# Patient Record
(demographics unavailable — no encounter records)

---

## 2025-07-21 NOTE — DISCUSSION/SUMMARY
[FreeTextEntry1] : Impression: Normal GYN exam, history of PCOS, mild urinary stress incontinence, dense breast  Recommendations: Self breast exam, mammography and breast sonogram annually, calcium and vitamin D supplementation regular exercise, Kegel exercises, consider pelvic floor physical therapy  Follow-up in 1 year

## 2025-07-21 NOTE — HISTORY OF PRESENT ILLNESS
[FreeTextEntry1] : Patient is a 47-year-old female who presents for a routine annual gynecologic examination.  Patient with a history of PCOS.  Patient states her cycles have been regular.  Patient's last mammogram was March 2024 patient has dense breasts.  Patient has complaints of occasional urinary stress incontinence advised Kegel exercises as well as pelvic floor physical therapy.